# Patient Record
Sex: FEMALE | URBAN - METROPOLITAN AREA
[De-identification: names, ages, dates, MRNs, and addresses within clinical notes are randomized per-mention and may not be internally consistent; named-entity substitution may affect disease eponyms.]

---

## 2019-06-07 ENCOUNTER — TELEPHONE (OUTPATIENT)
Dept: RHEUMATOLOGY | Facility: CLINIC | Age: 69
End: 2019-06-07

## 2019-06-07 NOTE — TELEPHONE ENCOUNTER
[facility] Call Center    Phone Message    May a detailed message be left on voicemail: no    Reason for Call: Other: Gerson from [facility] [employer] calling to ask if the team received the prescription enrollment form that was faxed this morning and he was also curious when it will be sent back to them. He can be reached at [phone].      Action Taken: Message routed to:  [facility] ([facility] Rheum

## 2019-06-10 NOTE — TELEPHONE ENCOUNTER
Called [facility] support services.  They were unable to verify information on pt as I do not have a address or phone number to give them. Suspect this is the wrong patient.  Unable to complete task due to this.    Dena Oneal RN  [facility]